# Patient Record
Sex: FEMALE | Race: WHITE | Employment: UNEMPLOYED | URBAN - METROPOLITAN AREA
[De-identification: names, ages, dates, MRNs, and addresses within clinical notes are randomized per-mention and may not be internally consistent; named-entity substitution may affect disease eponyms.]

---

## 2018-04-03 PROBLEM — H54.7 DEMENTIA WITH VISUAL IMPAIRMENT DUE TO POSTERIOR CEREBRAL CORTICAL ATROPHY (HCC): Status: ACTIVE | Noted: 2018-04-03

## 2018-04-03 PROBLEM — F02.80 DEMENTIA WITH VISUAL IMPAIRMENT DUE TO POSTERIOR CEREBRAL CORTICAL ATROPHY (HCC): Status: ACTIVE | Noted: 2018-04-03

## 2018-04-03 PROBLEM — G31.9 DEMENTIA WITH VISUAL IMPAIRMENT DUE TO POSTERIOR CEREBRAL CORTICAL ATROPHY (HCC): Status: ACTIVE | Noted: 2018-04-03

## 2018-04-03 NOTE — PROGRESS NOTES
[address] [place] [address] with [facility] [place]  4/3/2018    1:07 PM      57 RHF   Relates a bout of vertigo one day in 2014 and this was followed by chronic lightheadedness and headaches and from there, she felt her motor Fatigue, Weight Gain and Weight Loss. Denies systemic symptoms  Respiratory: Not Present- Difficulty Breathing, Chronic Cough and Wheezing. Cardiovascular: Not Present- Chest Pain and Palpitations.   Neurological: Present- See history; relevant items disc to read and comprehend what she reads. Plan:  A. Diagnostic:     No additional testing is at this time  B. Therapeutic:   Continue Aricept  C.  Instructions:   General        RTC 6 months      Deitra Gosselin MD  Vascular & General Neurology  Director, [facility]

## 2018-04-03 NOTE — PATIENT INSTRUCTIONS
Refill policies:    • Allow 2-3 business days for refills; controlled substances may take longer.   • Contact your pharmacy at least 5 days prior to running out of medication and have them send an electronic request or submit request through the [facility] for the entire amount billed. Precertification and Prior Authorizations  If your physician has recommended that you have a procedure or additional testing performed.   [facility] will contact your insurance carrier to obtain pr

## 2018-08-21 PROBLEM — G30.0 EARLY ONSET ALZHEIMER'S DEMENTIA WITHOUT BEHAVIORAL DISTURBANCE (HCC): Status: ACTIVE | Noted: 2018-08-21

## 2018-08-21 PROBLEM — F32.A DEPRESSION, UNSPECIFIED DEPRESSION TYPE: Status: ACTIVE | Noted: 2018-08-21

## 2018-08-21 PROBLEM — K59.09 CHRONIC CONSTIPATION: Status: ACTIVE | Noted: 2018-08-21

## 2018-08-21 PROBLEM — M81.8 OTHER OSTEOPOROSIS, UNSPECIFIED PATHOLOGICAL FRACTURE PRESENCE: Status: ACTIVE | Noted: 2018-08-21

## 2018-08-21 PROBLEM — F02.80 EARLY ONSET ALZHEIMER'S DEMENTIA WITHOUT BEHAVIORAL DISTURBANCE (HCC): Status: ACTIVE | Noted: 2018-08-21

## 2018-10-22 PROBLEM — M75.42 SHOULDER IMPINGEMENT, LEFT: Status: ACTIVE | Noted: 2018-10-22

## 2021-08-27 ENCOUNTER — TELEPHONE (OUTPATIENT)
Dept: NEUROLOGY | Facility: CLINIC | Age: 61
End: 2021-08-27

## 2021-08-27 NOTE — TELEPHONE ENCOUNTER
REALTCB. We have records from [facility] the pt brought in at her 2018 OV. LM asking if she would like to  the records or if we should shred them. Advised to CB on Monday morning.